# Patient Record
Sex: FEMALE | Race: WHITE | URBAN - METROPOLITAN AREA
[De-identification: names, ages, dates, MRNs, and addresses within clinical notes are randomized per-mention and may not be internally consistent; named-entity substitution may affect disease eponyms.]

---

## 2017-02-20 ENCOUNTER — TRANSFERRED RECORDS (OUTPATIENT)
Dept: HEALTH INFORMATION MANAGEMENT | Facility: CLINIC | Age: 67
End: 2017-02-20

## 2017-09-04 ENCOUNTER — OFFICE VISIT (OUTPATIENT)
Dept: URGENT CARE | Facility: URGENT CARE | Age: 67
End: 2017-09-04
Payer: COMMERCIAL

## 2017-09-04 ENCOUNTER — RADIANT APPOINTMENT (OUTPATIENT)
Dept: GENERAL RADIOLOGY | Facility: CLINIC | Age: 67
End: 2017-09-04
Attending: PHYSICIAN ASSISTANT
Payer: COMMERCIAL

## 2017-09-04 VITALS
DIASTOLIC BLOOD PRESSURE: 77 MMHG | SYSTOLIC BLOOD PRESSURE: 128 MMHG | TEMPERATURE: 97.9 F | OXYGEN SATURATION: 97 % | HEART RATE: 77 BPM | BODY MASS INDEX: 28.89 KG/M2 | WEIGHT: 204.2 LBS

## 2017-09-04 DIAGNOSIS — S49.91XA RIGHT SHOULDER INJURY, INITIAL ENCOUNTER: ICD-10-CM

## 2017-09-04 DIAGNOSIS — S42.291A OTHER CLOSED DISPLACED FRACTURE OF PROXIMAL END OF RIGHT HUMERUS, INITIAL ENCOUNTER: Primary | ICD-10-CM

## 2017-09-04 PROCEDURE — 99203 OFFICE O/P NEW LOW 30 MIN: CPT | Performed by: PHYSICIAN ASSISTANT

## 2017-09-04 PROCEDURE — 73030 X-RAY EXAM OF SHOULDER: CPT | Mod: RT

## 2017-09-04 ASSESSMENT — ENCOUNTER SYMPTOMS
NEUROLOGICAL NEGATIVE: 1
CONSTITUTIONAL NEGATIVE: 1
EYES NEGATIVE: 1
GASTROINTESTINAL NEGATIVE: 1
PSYCHIATRIC NEGATIVE: 1
RESPIRATORY NEGATIVE: 1
CARDIOVASCULAR NEGATIVE: 1

## 2017-09-04 NOTE — PROGRESS NOTES
SUBJECTIVE:   Wen Ozuna is a 67 year old female who presents to clinic today for the following health issues:      Fall      Duration: Today    Description (location/character/radiation): shoulder, head    Intensity:  moderate    Accompanying signs and symptoms: pain, unable to move arm    History (similar episodes/previous evaluation): None    Precipitating or alleviating factors: moving    Therapies tried and outcome: None     Walking up stairs this morning, her shoe slipped off and tripped her.  She fell on her right shoulder and hit the right side of her head.  No memory loss  Felt nauseated and dizzy at first  Now no headache, nausea, vomiting, photophobia, confusion, blurry vision.     Now she cannot move her right arm  The right outer shoulder and under the armpit hurts  Some swelling  Happened 1.5 hours ago  No tenderness in the elbow - but the pain radiates from shoulder to the forearm  No numbness and tingling in the fingers.     Problem list and histories reviewed & adjusted, as indicated.  Additional history: as documented    Patient Active Problem List   Diagnosis     Health Care Home     No past surgical history on file.    Social History   Substance Use Topics     Smoking status: Never Smoker     Smokeless tobacco: Never Used     Alcohol use Not on file     No family history on file.      Current Outpatient Prescriptions   Medication Sig Dispense Refill     doxycycline (VIBRAMYCIN) 100 MG capsule Take 1 capsule (100 mg) by mouth 2 times daily (Patient not taking: Reported on 9/4/2017) 28 capsule 0     NO ACTIVE MEDICATIONS        Allergies   Allergen Reactions     Sulfa Drugs          Reviewed and updated as needed this visit by clinical staff     Reviewed and updated as needed this visit by Provider         Review of Systems   Constitutional: Negative.    HENT: Negative.    Eyes: Negative.    Respiratory: Negative.    Cardiovascular: Negative.    Gastrointestinal: Negative.    Genitourinary:  Negative.    Musculoskeletal:        As in HPI   Skin: Negative.    Neurological: Negative.    Psychiatric/Behavioral: Negative.          OBJECTIVE:     /77 (BP Location: Left arm, Patient Position: Chair, Cuff Size: Adult Regular)  Pulse 77  Temp 97.9  F (36.6  C) (Oral)  Wt 204 lb 3.2 oz (92.6 kg)  SpO2 97%  BMI 28.89 kg/m2  Body mass index is 28.89 kg/(m^2).  GENERAL: healthy, alert and no distress.  Patient is wearing a homemade sling on the right arm.   MS: Right shoulder - tender over the proximal humerus and fullness over the anterior shoulder. No tenderness at the clavicle, elbow, wrist or hand.  Sensation intact distally.  Normal  strength.   SKIN: no suspicious lesions or rashes  NEURO: Normal strength and tone, mentation intact and speech normal    Diagnostic Test Results:  XR 3 views of the right shoulder:  3-part displaced proximal humerus fracture.  No dislocation.     ASSESSMENT/PLAN:     1. Other closed displaced fracture of proximal end of right humerus, initial encounter  - XR Shoulder Right G/E 3 Views; Future  - ORTHO  REFERRAL  - ARM SLING, M    She will take acetaminophen as needed for pain.  Use ice.  Wear the sling at all times.  Sleep in a recliner for comfort.  Follow up with orthopedics this week.     Elyssa Gibbons PA-C  [facility]

## 2017-09-04 NOTE — NURSING NOTE
"Chief Complaint   Patient presents with     Fall     Patient had fall this morning and injured right shoulder and right side of face       Initial /77 (BP Location: Left arm, Patient Position: Chair, Cuff Size: Adult Regular)  Pulse 77  Temp 97.9  F (36.6  C) (Oral)  Wt 204 lb 3.2 oz (92.6 kg)  SpO2 97%  BMI 28.89 kg/m2 Estimated body mass index is 28.89 kg/(m^2) as calculated from the following:    Height as of 8/9/13: 5' 10.5\" (1.791 m).    Weight as of this encounter: 204 lb 3.2 oz (92.6 kg).  Medication Reconciliation: complete       Genesis Pritchard    "

## 2017-09-04 NOTE — MR AVS SNAPSHOT
After Visit Summary   9/4/2017    Wen Ozuna    MRN: [id number]           Patient Information     Date Of Birth          1950        Visit Information        Provider Department      9/4/2017 9:25 AM Elyssa Gibbons PA-C [facility]        Today's Diagnoses     Right shoulder injury, initial encounter    -  1       Follow-ups after your visit        Additional Services     ORTHO  REFERRAL       [facility] is referring you to the Orthopedic  Services at [facility].       The  Representative will assist you in the coordination of your Orthopedic and Musculoskeletal Care as prescribed by your physician.    The  Representative will call you within 1 business day to help schedule your appointment, or you may contact the  Representative at:    All areas ~ [phone]     Type of Referral : Surgical / Specialist - shoulder surgeon       Timeframe requested: 1 - 2 days    Coverage of these services is subject to the terms and limitations of your health insurance plan.  Please call member services at your health plan with any benefit or coverage questions.      If X-rays, CT or MRI's have been performed, please contact the facility where they were done to arrange for , prior to your scheduled appointment.  Please bring this referral request to your appointment and present it to your specialist.                  Future tests that were ordered for you today     Open Future Orders        Priority Expected Expires Ordered    XR Humerus Right G/E 2 Views Routine 9/4/2017 9/4/2018 9/4/2017            Who to contact     If you have questions or need follow up information about today's clinic visit or your schedule please contact [facility] directly at [phone].  Normal or non-critical lab and imaging results will be communicated to you by MyChart, letter or phone  "within 4 business days after the clinic has received the results. If you do not hear from us within 7 days, please contact the clinic through Tarisa or phone. If you have a critical or abnormal lab result, we will notify you by phone as soon as possible.  Submit refill requests through Tarisa or call your pharmacy and they will forward the refill request to us. Please allow 3 business days for your refill to be completed.          Additional Information About Your Visit        Tarisa Information     Tarisa lets you send messages to your doctor, view your test results, renew your prescriptions, schedule appointments and more. To sign up, go to [email or web address] . Click on \"Log in\" on the left side of the screen, which will take you to the Welcome page. Then click on \"Sign up Now\" on the right side of the page.     You will be asked to enter the access code listed below, as well as some personal information. Please follow the directions to create your username and password.     Your access code is: [email or web address]  Expires: 12/3/2017 10:51 AM     Your access code will  in 90 days. If you need help or a new code, please call your [place] clinic or [phone].        Care EveryWhere ID     This is your Care EveryWhere ID. This could be used by other organizations to access your [place] medical records  [id number]        Your Vitals Were     Pulse Temperature Pulse Oximetry BMI (Body Mass Index)          77 97.9  F (36.6  C) (Oral) 97% 28.89 kg/m2         Blood Pressure from Last 3 Encounters:   17 128/77   13 121/73   13 118/70    Weight from Last 3 Encounters:   17 204 lb 3.2 oz (92.6 kg)   13 180 lb 6.4 oz (81.8 kg)   13 182 lb (82.6 kg)              We Performed the Following     ARM SLING, M     ORTHO  REFERRAL        Primary Care Provider    None Specified       No primary provider on file.        Equal Access to Services     MIHAI HARVEY AH: Hadii aad " bri Jones, wagreyda lueliazaradaha, qanikkieta kaalmada jennifer, kenan gauriin hayaapanchito torresmegan margotrodri lacarolannpanchito yves. So [facility] [phone].    ATENCIÓN: Si habla español, tiene a hernandez disposición servicios gratuitos de asistencia lingüística. Ulicesame al [phone].    We comply with applicable federal civil rights laws and [place] laws. We do not discriminate on the basis of race, color, national origin, age, disability sex, sexual orientation or gender identity.            Thank you!     Thank you for choosing [facility]  for your care. Our goal is always to provide you with excellent care. Hearing back from our patients is one way we can continue to improve our services. Please take a few minutes to complete the written survey that you may receive in the mail after your visit with us. Thank you!             Your Updated Medication List - Protect others around you: Learn how to safely use, store and throw away your medicines at www.disposemymeds.org.          This list is accurate as of: 9/4/17 10:51 AM.  Always use your most recent med list.                   Brand Name Dispense Instructions for use Diagnosis    doxycycline 100 MG capsule    VIBRAMYCIN    28 capsule    Take 1 capsule (100 mg) by mouth 2 times daily    Macular rash       NO ACTIVE MEDICATIONS

## 2017-11-18 ENCOUNTER — TRANSFERRED RECORDS (OUTPATIENT)
Dept: HEALTH INFORMATION MANAGEMENT | Facility: CLINIC | Age: 67
End: 2017-11-18

## 2018-02-08 ENCOUNTER — TRANSFERRED RECORDS (OUTPATIENT)
Dept: HEALTH INFORMATION MANAGEMENT | Facility: CLINIC | Age: 68
End: 2018-02-08

## 2018-09-11 ENCOUNTER — TRANSFERRED RECORDS (OUTPATIENT)
Dept: HEALTH INFORMATION MANAGEMENT | Facility: CLINIC | Age: 68
End: 2018-09-11